# Patient Record
Sex: MALE | Race: WHITE
[De-identification: names, ages, dates, MRNs, and addresses within clinical notes are randomized per-mention and may not be internally consistent; named-entity substitution may affect disease eponyms.]

---

## 2018-12-13 ENCOUNTER — HOSPITAL ENCOUNTER (OUTPATIENT)
Dept: HOSPITAL 47 - RADCTMAIN | Age: 73
Discharge: HOME | End: 2018-12-13
Attending: FAMILY MEDICINE
Payer: MEDICARE

## 2018-12-13 DIAGNOSIS — Z86.73: ICD-10-CM

## 2018-12-13 DIAGNOSIS — I67.82: ICD-10-CM

## 2018-12-13 DIAGNOSIS — G31.9: Primary | ICD-10-CM

## 2018-12-13 PROCEDURE — 70460 CT HEAD/BRAIN W/DYE: CPT

## 2018-12-13 NOTE — CT
EXAMINATION TYPE: CT brain w con

 

DATE OF EXAM: 12/13/2018

 

COMPARISON: None

 

INDICATION: History of TIA, vision changes.

 

DLP: 1201 mGycm, Automated exposure control for dose reduction was used.

 

CONTRAST: 100 mL [id number]

 

CT of the brain is performed utilizing 3 mm thick sections through the posterior fossa and 3 mm thick
 sections through the remaining calvarium.  Study is performed within 24 hours of arrival to the hosp
ital. Study is a postcontrast study.

 

No abnormal hyperdensity is present to suggest an acute intracranial hemorrhage.

No mass lesion is evident.

No acute infarcts are evident. Old lacunar infarct within the right caudate head is present. There is
 periventricular white matter hypodensity likely on the basis of chronic white matter ischemic change
s. Some watershed ischemic changes may be in the left parietal-occipital region. Old infarct within t
he right occipital lobe is not excluded. This could be subacute.  No mass effect on adjacent sulci is
 evident however.

Ventricles and sulci are prominent for the patient age.  

There is a mucous retention cyst within the left maxillary sinus. Posterior ethmoid air cell mucosal 
thickening is present.

No suspicious enhancement is evident.

 

IMPRESSIONS:

1.   Atrophy with periventricular white matter ischemic changes.

2. Suggestion of old ischemic changes left watershed, right occipital lobe and right caudate head.

## 2019-06-05 ENCOUNTER — HOSPITAL ENCOUNTER (OUTPATIENT)
Dept: HOSPITAL 47 - RADUSWWP | Age: 74
Discharge: HOME | End: 2019-06-05
Attending: NURSE PRACTITIONER
Payer: MEDICARE

## 2019-06-05 DIAGNOSIS — I65.21: Primary | ICD-10-CM

## 2019-06-05 PROCEDURE — 93880 EXTRACRANIAL BILAT STUDY: CPT

## 2019-06-05 NOTE — US
EXAMINATION TYPE: US carotid duplex BILAT

 

DATE OF EXAM: 6/5/2019

 

COMPARISON: NONE

 

CLINICAL HISTORY: R42 dizziness. Dizziness, history of stroke

 

EXAM MEASUREMENTS: 

 

RIGHT:  Peak Systolic Velocity (PSV) cm/sec

----- Right CCA:  79.1  

----- Right ICA:

----- Right ECA:  200.9   

ICA/CCA ratio:  0.7    

 

RIGHT:  End Diastole cm/sec

----- Right CCA:  13.4   

----- Right ICA:

----- Right ECA:  31.4     

 

LEFT:  Peak Systolic Velocity (PSV) cm/sec

----- Left CCA:  51.7  

----- Left ICA:  179.1   

----- Left ECA:  100.9  

ICA/CCA ratio:  3.5  

 

LEFT:  End Diastole cm/sec

----- Left CCA:  15.8  

----- Left ICA:  81.5   

----- Left ECA:  12.8 

 

VERTEBRALS (direction of flow):

Right Vertebral: Antegrade

Left Vertebral: Antegrade

 

Rhythm:  Normal

 

Right ICA occluded, left ICA/CCA ratio 3.5 for 50 to 69% stenosis 

 

 

 

IMPRESSION:  There is antegrade flow in the vertebral arteries.

 

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

 

There is elevated velocity left internal carotid artery that suggests 70% stenosis.   

 

 

Criteria for Assigning % of Stenosis / Diameter reduction

(Estimation based on the indirect measurements of the internal carotid artery velocities (ICA PSV).

1.  Normal (no stenosis)=ICA PSV < 125 cm/s: ratio < 2.0: ICA EDV<40 cm/s.

2. Less than 50% stenosis=ICA PSV < 125 cm/s: ratio < 2.0: ICA EDV<40 cm/s.

3.  50 to 69% stenosis=ICA PSV of 125 to 230 cm/s: ration 2.0 ? 4.0: ICA EDV  cm/s.

4.  Greater than 70% stenosis to near occlusion= ICA PSV > 230 cm/s: ratio > 4.0: ICA EDV > 100 cm/s.
 

5.  Near occlusion= ICA PSV velocities may be low or undetectable: variable ratio and ICA EDV.

6.  Total occlusion=unable to detect flow.